# Patient Record
Sex: MALE | Race: BLACK OR AFRICAN AMERICAN | NOT HISPANIC OR LATINO
[De-identification: names, ages, dates, MRNs, and addresses within clinical notes are randomized per-mention and may not be internally consistent; named-entity substitution may affect disease eponyms.]

---

## 2020-08-13 ENCOUNTER — APPOINTMENT (OUTPATIENT)
Dept: OTOLARYNGOLOGY | Facility: CLINIC | Age: 45
End: 2020-08-13
Payer: COMMERCIAL

## 2020-08-13 VITALS
HEIGHT: 70 IN | DIASTOLIC BLOOD PRESSURE: 70 MMHG | TEMPERATURE: 97.2 F | WEIGHT: 200 LBS | SYSTOLIC BLOOD PRESSURE: 122 MMHG | BODY MASS INDEX: 28.63 KG/M2

## 2020-08-13 DIAGNOSIS — Z78.9 OTHER SPECIFIED HEALTH STATUS: ICD-10-CM

## 2020-08-13 PROCEDURE — 99203 OFFICE O/P NEW LOW 30 MIN: CPT | Mod: 25

## 2020-08-13 PROCEDURE — 69210 REMOVE IMPACTED EAR WAX UNI: CPT

## 2020-08-13 RX ORDER — HYDROCORTISONE AND ACETIC ACID OTIC 20.75; 10.375 MG/ML; MG/ML
1-2 SOLUTION AURICULAR (OTIC) TWICE DAILY
Qty: 1 | Refills: 2 | Status: ACTIVE | COMMUNITY
Start: 2020-08-13 | End: 1900-01-01

## 2020-08-13 NOTE — HISTORY OF PRESENT ILLNESS
[de-identified] : 44 y/o M with a h/o itchy ears, R > L over the past few months.  He has no HL or tinnitus.

## 2020-08-13 NOTE — PHYSICAL EXAM
[de-identified] : EAC's mildly inflamed bilaterally.  Debris removed on R.   [Midline] : trachea located in midline position [Normal] : no rashes

## 2023-02-28 ENCOUNTER — APPOINTMENT (OUTPATIENT)
Dept: ORTHOPEDIC SURGERY | Facility: CLINIC | Age: 48
End: 2023-02-28
Payer: COMMERCIAL

## 2023-02-28 VITALS — BODY MASS INDEX: 28.92 KG/M2 | WEIGHT: 202 LBS | HEIGHT: 70 IN

## 2023-02-28 PROCEDURE — 99203 OFFICE O/P NEW LOW 30 MIN: CPT

## 2023-02-28 PROCEDURE — 72100 X-RAY EXAM L-S SPINE 2/3 VWS: CPT

## 2023-02-28 PROCEDURE — 72170 X-RAY EXAM OF PELVIS: CPT

## 2023-02-28 PROCEDURE — 73564 X-RAY EXAM KNEE 4 OR MORE: CPT | Mod: 50

## 2023-02-28 NOTE — PHYSICAL EXAM
[5___] : hamstring 5[unfilled]/5 [Right] : right knee [NL (0)] : extension 0 degrees [4___] : hamstring 4[unfilled]/5 [Equivocal] : equivocal Rossana [No bony abnormalities] : No bony abnormalities [Straightening consistent with spasm] : Straightening consistent with spasm [] : non-antalgic [Left] : left knee [Degenerative change] : Degenerative change [FreeTextEntry3] : well healed surgical scars [FreeTextEntry8] : also prominent tibial tubercle - no ttp [de-identified] : very tight hamstrings [TWNoteComboBox7] : flexion 120 degrees

## 2023-02-28 NOTE — DISCUSSION/SUMMARY
[de-identified] : Progress Note completed by Maryuri Mitchell PA-C\par * Dr. Carbone -- The documentation recorded in this note accurately reflects the decisions made by me during this visit.

## 2023-02-28 NOTE — HISTORY OF PRESENT ILLNESS
[9] : 9 [7] : 7 [Dull/Aching] : dull/aching [Radiating] : radiating [Sharp] : sharp [Shooting] : shooting [Intermittent] : intermittent [de-identified] : 2/28/23:  chronic bilateral knee pain.  history of left knee scope by dr ruelas.  also lower back pain for years.  [] : no [FreeTextEntry1] : B knees [FreeTextEntry5] : Pt has been having pain on and off for a few years. No reported injury. He states he had surgery on the left knee in 2015. Pt was told he had a partial tear in the meniscus in 2021.  [FreeTextEntry7] : down the leg

## 2023-02-28 NOTE — ASSESSMENT
[FreeTextEntry1] : chronic bilateral knee pain.  history of left knee scope by dr ruelas.  no new injury or trauma.  very tight hamstrings.  possible pf exacerbation, possible lmt.  pf oa prsent.  mild.\par \par also lower back pain for years.  possible hnp. \par \par nurse - works remotely. \par \par \par

## 2023-03-05 ENCOUNTER — FORM ENCOUNTER (OUTPATIENT)
Age: 48
End: 2023-03-05

## 2023-03-06 ENCOUNTER — APPOINTMENT (OUTPATIENT)
Dept: MRI IMAGING | Facility: CLINIC | Age: 48
End: 2023-03-06
Payer: COMMERCIAL

## 2023-03-06 ENCOUNTER — APPOINTMENT (OUTPATIENT)
Dept: MRI IMAGING | Facility: CLINIC | Age: 48
End: 2023-03-06

## 2023-03-06 PROCEDURE — 73721 MRI JNT OF LWR EXTRE W/O DYE: CPT | Mod: LT

## 2023-03-06 PROCEDURE — 72148 MRI LUMBAR SPINE W/O DYE: CPT

## 2023-03-06 PROCEDURE — 73721 MRI JNT OF LWR EXTRE W/O DYE: CPT | Mod: RT

## 2023-03-14 ENCOUNTER — APPOINTMENT (OUTPATIENT)
Dept: ORTHOPEDIC SURGERY | Facility: CLINIC | Age: 48
End: 2023-03-14
Payer: COMMERCIAL

## 2023-03-14 ENCOUNTER — TRANSCRIPTION ENCOUNTER (OUTPATIENT)
Age: 48
End: 2023-03-14

## 2023-03-14 VITALS — WEIGHT: 202 LBS | BODY MASS INDEX: 28.92 KG/M2 | HEIGHT: 70 IN

## 2023-03-14 DIAGNOSIS — H61.21 IMPACTED CERUMEN, RIGHT EAR: ICD-10-CM

## 2023-03-14 DIAGNOSIS — H60.63 UNSPECIFIED CHRONIC OTITIS EXTERNA, BILATERAL: ICD-10-CM

## 2023-03-14 DIAGNOSIS — Z00.00 ENCOUNTER FOR GENERAL ADULT MEDICAL EXAMINATION W/OUT ABNORMAL FINDINGS: ICD-10-CM

## 2023-03-14 PROCEDURE — 99214 OFFICE O/P EST MOD 30 MIN: CPT

## 2023-03-14 RX ORDER — DICLOFENAC SODIUM 75 MG/1
75 TABLET, DELAYED RELEASE ORAL
Qty: 60 | Refills: 0 | Status: ACTIVE | COMMUNITY
Start: 2023-03-14 | End: 1900-01-01

## 2023-03-14 NOTE — DATA REVIEWED
[Knee] : knee [MRI] : MRI [Lumbar Spine] : lumbar spine [Report was reviewed and noted in the chart] : The report was reviewed and noted in the chart [I reviewed the films/CD and agree] : I reviewed the films/CD and agree

## 2023-03-14 NOTE — HISTORY OF PRESENT ILLNESS
[Lower back] : lower back [Dull/Aching] : dull/aching [Radiating] : radiating [Sharp] : sharp [Shooting] : shooting [9] : 9 [7] : 7 [Intermittent] : intermittent [de-identified] : 2/28/23:  chronic bilateral knee pain.  history of left knee scope by dr ruelas.  also lower back pain for years. \par 3/14/23:  back to review mris.\par  [] : no [FreeTextEntry1] : B knees [FreeTextEntry5] : Pt has been having pain on and off for a few years. No reported injury. He states he had surgery on the left knee in 2015. Pt was told he had a partial tear in the meniscus in 2021.  [FreeTextEntry7] : down the leg [de-identified] : none

## 2023-03-14 NOTE — ASSESSMENT
[FreeTextEntry1] : chronic bilateral knee pain.  history of left knee scope by dr ruelas.  no new injury or trauma.  very tight hamstrings.  mris done 2023 which shows mild oa,  ligament sprains.\par \par also lower back pain for years.  hnp l5 s1. defers pain or spine consult at this point. \par \par nurse - works remotely. \par \par \par

## 2023-08-03 ENCOUNTER — APPOINTMENT (OUTPATIENT)
Dept: ORTHOPEDIC SURGERY | Facility: CLINIC | Age: 48
End: 2023-08-03
Payer: COMMERCIAL

## 2023-08-03 VITALS — HEIGHT: 70 IN | BODY MASS INDEX: 28.92 KG/M2 | WEIGHT: 202 LBS

## 2023-08-03 DIAGNOSIS — S83.91XA SPRAIN OF UNSPECIFIED SITE OF RIGHT KNEE, INITIAL ENCOUNTER: ICD-10-CM

## 2023-08-03 DIAGNOSIS — S83.92XA SPRAIN OF UNSPECIFIED SITE OF LEFT KNEE, INITIAL ENCOUNTER: ICD-10-CM

## 2023-08-03 DIAGNOSIS — M54.16 RADICULOPATHY, LUMBAR REGION: ICD-10-CM

## 2023-08-03 DIAGNOSIS — M17.4 OTHER BILATERAL SECONDARY OSTEOARTHRITIS OF KNEE: ICD-10-CM

## 2023-08-03 PROCEDURE — 99213 OFFICE O/P EST LOW 20 MIN: CPT

## 2023-08-03 NOTE — PHYSICAL EXAM
[5___] : hamstring 5[unfilled]/5 [Right] : right knee [NL (0)] : extension 0 degrees [4___] : hamstring 4[unfilled]/5 [Equivocal] : equivocal Rossana [Left] : left knee [Degenerative change] : Degenerative change [] : non-antalgic [FreeTextEntry3] : well healed surgical scars [FreeTextEntry8] : also prominent tibial tubercle - no ttp [de-identified] : very tight hamstrings [TWNoteComboBox7] : flexion 110 degrees

## 2023-08-03 NOTE — ASSESSMENT
[FreeTextEntry1] : chronic bilateral knee pain.  history of left knee scope by dr ruelas.  no new injury or trauma.  very tight hamstrings.  mris done 2023 which shows mild oa,  ligament sprains. PT helps.    also lower back pain for years.  hnp l5 s1. defers pain or spine consult at this point. PT heps but still alot of pain. no focal deficit.  nurse - works remotely.

## 2023-08-03 NOTE — HISTORY OF PRESENT ILLNESS
[Dull/Aching] : dull/aching [Sharp] : sharp [Stabbing] : stabbing [Lower back] : lower back [9] : 9 [7] : 7 [Radiating] : radiating [Shooting] : shooting [Intermittent] : intermittent [de-identified] : 2/28/23:  chronic bilateral knee pain.  history of left knee scope by dr ruelas.  also lower back pain for years.  3/14/23:  back to review mris. 8/3/23:  back pain persists.  some knee pain as well. [] : Post Surgical Visit: no [FreeTextEntry1] : B knees [FreeTextEntry5] : Pt has been having pain on and off for a few years. No reported injury. He states he had surgery on the left knee in 2015. Pt was told he had a partial tear in the meniscus in 2021.  [FreeTextEntry7] : down the leg [de-identified] : none

## 2023-09-14 ENCOUNTER — APPOINTMENT (OUTPATIENT)
Dept: ORTHOPEDIC SURGERY | Facility: CLINIC | Age: 48
End: 2023-09-14

## 2023-10-01 PROBLEM — M54.16 LUMBAR RADICULAR PAIN: Status: ACTIVE | Noted: 2023-02-28
